# Patient Record
Sex: FEMALE | Race: WHITE | NOT HISPANIC OR LATINO | URBAN - METROPOLITAN AREA
[De-identification: names, ages, dates, MRNs, and addresses within clinical notes are randomized per-mention and may not be internally consistent; named-entity substitution may affect disease eponyms.]

---

## 2017-01-12 ENCOUNTER — OUTPATIENT (OUTPATIENT)
Dept: OUTPATIENT SERVICES | Facility: HOSPITAL | Age: 72
LOS: 1 days | End: 2017-01-12
Payer: MEDICARE

## 2017-01-12 DIAGNOSIS — Z90.49 ACQUIRED ABSENCE OF OTHER SPECIFIED PARTS OF DIGESTIVE TRACT: Chronic | ICD-10-CM

## 2017-01-12 PROCEDURE — 73502 X-RAY EXAM HIP UNI 2-3 VIEWS: CPT

## 2017-01-12 PROCEDURE — 73502 X-RAY EXAM HIP UNI 2-3 VIEWS: CPT | Mod: 26

## 2020-01-21 ENCOUNTER — APPOINTMENT (OUTPATIENT)
Dept: HEART AND VASCULAR | Facility: CLINIC | Age: 75
End: 2020-01-21

## 2020-01-21 ENCOUNTER — APPOINTMENT (OUTPATIENT)
Dept: HEART AND VASCULAR | Facility: CLINIC | Age: 75
End: 2020-01-21
Payer: MEDICARE

## 2020-01-21 ENCOUNTER — NON-APPOINTMENT (OUTPATIENT)
Age: 75
End: 2020-01-21

## 2020-01-21 VITALS — DIASTOLIC BLOOD PRESSURE: 70 MMHG | SYSTOLIC BLOOD PRESSURE: 140 MMHG

## 2020-01-21 VITALS
SYSTOLIC BLOOD PRESSURE: 144 MMHG | DIASTOLIC BLOOD PRESSURE: 76 MMHG | HEIGHT: 66 IN | HEART RATE: 55 BPM | BODY MASS INDEX: 26.52 KG/M2 | WEIGHT: 165 LBS

## 2020-01-21 DIAGNOSIS — E78.5 HYPERLIPIDEMIA, UNSPECIFIED: ICD-10-CM

## 2020-01-21 DIAGNOSIS — I10 ESSENTIAL (PRIMARY) HYPERTENSION: ICD-10-CM

## 2020-01-21 DIAGNOSIS — R94.31 ABNORMAL ELECTROCARDIOGRAM [ECG] [EKG]: ICD-10-CM

## 2020-01-21 DIAGNOSIS — K21.9 GASTRO-ESOPHAGEAL REFLUX DISEASE W/OUT ESOPHAGITIS: ICD-10-CM

## 2020-01-21 DIAGNOSIS — M15.9 POLYOSTEOARTHRITIS, UNSPECIFIED: ICD-10-CM

## 2020-01-21 PROCEDURE — 93000 ELECTROCARDIOGRAM COMPLETE: CPT

## 2020-01-21 PROCEDURE — 99215 OFFICE O/P EST HI 40 MIN: CPT | Mod: 25

## 2020-01-21 RX ORDER — LISINOPRIL 10 MG/1
10 TABLET ORAL DAILY
Qty: 30 | Refills: 6 | Status: ACTIVE | COMMUNITY
Start: 2020-01-21

## 2020-01-21 RX ORDER — METOPROLOL TARTRATE 25 MG/1
25 TABLET, FILM COATED ORAL
Refills: 0 | Status: ACTIVE | COMMUNITY

## 2020-02-29 PROBLEM — M15.9 GENERALIZED OSTEOARTHRITIS: Status: ACTIVE | Noted: 2020-02-29

## 2020-02-29 PROBLEM — K21.9 CHRONIC GERD: Status: ACTIVE | Noted: 2020-02-29

## 2020-02-29 PROBLEM — R94.31 ABNORMAL EKG: Status: ACTIVE | Noted: 2020-02-29

## 2020-02-29 PROBLEM — E78.5 HYPERLIPIDEMIA: Status: ACTIVE | Noted: 2020-02-29

## 2020-03-03 ENCOUNTER — APPOINTMENT (OUTPATIENT)
Dept: HEART AND VASCULAR | Facility: CLINIC | Age: 75
End: 2020-03-03

## 2020-07-21 ENCOUNTER — APPOINTMENT (OUTPATIENT)
Dept: HEART AND VASCULAR | Facility: CLINIC | Age: 75
End: 2020-07-21

## 2023-11-12 ENCOUNTER — OUTPATIENT (OUTPATIENT)
Dept: OUTPATIENT SERVICES | Facility: HOSPITAL | Age: 78
LOS: 1 days | End: 2023-11-12
Payer: MEDICARE

## 2023-11-12 ENCOUNTER — APPOINTMENT (OUTPATIENT)
Dept: CT IMAGING | Facility: HOSPITAL | Age: 78
End: 2023-11-12

## 2023-11-12 DIAGNOSIS — Z90.49 ACQUIRED ABSENCE OF OTHER SPECIFIED PARTS OF DIGESTIVE TRACT: Chronic | ICD-10-CM

## 2023-11-12 LAB
POCT ISTAT CREATININE: 0.8 MG/DL — SIGNIFICANT CHANGE UP (ref 0.5–1.3)
POCT ISTAT CREATININE: 0.8 MG/DL — SIGNIFICANT CHANGE UP (ref 0.5–1.3)

## 2023-11-12 PROCEDURE — 74177 CT ABD & PELVIS W/CONTRAST: CPT | Mod: 26,MH

## 2023-11-12 PROCEDURE — 74177 CT ABD & PELVIS W/CONTRAST: CPT

## 2023-11-12 PROCEDURE — 82565 ASSAY OF CREATININE: CPT

## 2023-11-16 ENCOUNTER — TRANSCRIPTION ENCOUNTER (OUTPATIENT)
Age: 78
End: 2023-11-16

## 2023-11-16 ENCOUNTER — OUTPATIENT (OUTPATIENT)
Dept: OUTPATIENT SERVICES | Facility: HOSPITAL | Age: 78
LOS: 1 days | Discharge: HOME | End: 2023-11-16
Payer: MEDICARE

## 2023-11-16 VITALS
RESPIRATION RATE: 25 BRPM | DIASTOLIC BLOOD PRESSURE: 67 MMHG | HEART RATE: 62 BPM | OXYGEN SATURATION: 100 % | TEMPERATURE: 97 F | SYSTOLIC BLOOD PRESSURE: 158 MMHG

## 2023-11-16 VITALS
DIASTOLIC BLOOD PRESSURE: 68 MMHG | TEMPERATURE: 97 F | HEART RATE: 64 BPM | SYSTOLIC BLOOD PRESSURE: 168 MMHG | RESPIRATION RATE: 17 BRPM | OXYGEN SATURATION: 98 %

## 2023-11-16 DIAGNOSIS — Z90.49 ACQUIRED ABSENCE OF OTHER SPECIFIED PARTS OF DIGESTIVE TRACT: Chronic | ICD-10-CM

## 2023-11-16 PROCEDURE — 43262 ENDO CHOLANGIOPANCREATOGRAPH: CPT

## 2023-11-16 PROCEDURE — 43264 ERCP REMOVE DUCT CALCULI: CPT

## 2023-11-16 PROCEDURE — 76000 FLUOROSCOPY <1 HR PHYS/QHP: CPT

## 2023-11-16 PROCEDURE — C1769: CPT

## 2023-11-16 PROCEDURE — C1889: CPT

## 2023-11-16 PROCEDURE — 43273 ENDOSCOPIC PANCREATOSCOPY: CPT

## 2023-11-16 DEVICE — HYDRATOME 44: Type: IMPLANTABLE DEVICE | Status: FUNCTIONAL

## 2023-11-16 DEVICE — BLLN EXTRCTR PRO RX-S 9-12MM: Type: IMPLANTABLE DEVICE | Status: FUNCTIONAL

## 2023-11-16 RX ORDER — ONDANSETRON 8 MG/1
4 TABLET, FILM COATED ORAL ONCE
Refills: 0 | Status: DISCONTINUED | OUTPATIENT
Start: 2023-11-16 | End: 2023-11-16

## 2023-11-16 RX ORDER — HYDROMORPHONE HYDROCHLORIDE 2 MG/ML
0.5 INJECTION INTRAMUSCULAR; INTRAVENOUS; SUBCUTANEOUS
Refills: 0 | Status: DISCONTINUED | OUTPATIENT
Start: 2023-11-16 | End: 2023-11-16

## 2023-11-16 RX ORDER — SODIUM CHLORIDE 9 MG/ML
1000 INJECTION INTRAMUSCULAR; INTRAVENOUS; SUBCUTANEOUS
Refills: 0 | Status: DISCONTINUED | OUTPATIENT
Start: 2023-11-16 | End: 2023-11-16

## 2023-11-16 NOTE — PRE-ANESTHESIA EVALUATION ADULT - NSANTHOSAYNRD_GEN_A_CORE
No. MIMA screening performed.  STOP BANG Legend: 0-2 = LOW Risk; 3-4 = INTERMEDIATE Risk; 5-8 = HIGH Risk

## 2023-11-16 NOTE — PRE-ANESTHESIA EVALUATION ADULT - NSANTHPMHFT_GEN_ALL_CORE
additional PSH: right hip arthroplasty (2016) 78F PMH HTN, HLD, OA, abnormal EKG in the past, presents for ERCP for gallstone pancreatitis    additional PSH: right hip arthroplasty (2016) 78F PMH HTN, HLD, OA, abnormal EKG in the past, presents for ERCP for gallstone pancreatitis. METS >4, pt denies any cardiac issues. TTE from 2012 showed normal EF, however no recent echo report available    additional PSH: right hip arthroplasty (2016)

## (undated) DEVICE — ELCTR GROUNDING PAD ADULT COVIDIEN